# Patient Record
Sex: MALE | Race: WHITE
[De-identification: names, ages, dates, MRNs, and addresses within clinical notes are randomized per-mention and may not be internally consistent; named-entity substitution may affect disease eponyms.]

---

## 2019-06-30 ENCOUNTER — HOSPITAL ENCOUNTER (INPATIENT)
Dept: HOSPITAL 15 - ER | Age: 84
LOS: 17 days | Discharge: HOME | DRG: 656 | End: 2019-07-17
Attending: INTERNAL MEDICINE | Admitting: NURSE PRACTITIONER
Payer: COMMERCIAL

## 2019-06-30 VITALS — DIASTOLIC BLOOD PRESSURE: 72 MMHG | SYSTOLIC BLOOD PRESSURE: 130 MMHG

## 2019-06-30 VITALS — BODY MASS INDEX: 27.16 KG/M2 | HEIGHT: 75 IN | WEIGHT: 218.48 LBS

## 2019-06-30 DIAGNOSIS — N28.1: ICD-10-CM

## 2019-06-30 DIAGNOSIS — M94.8X9: ICD-10-CM

## 2019-06-30 DIAGNOSIS — M48.50XA: ICD-10-CM

## 2019-06-30 DIAGNOSIS — K21.9: ICD-10-CM

## 2019-06-30 DIAGNOSIS — K57.30: ICD-10-CM

## 2019-06-30 DIAGNOSIS — N17.0: ICD-10-CM

## 2019-06-30 DIAGNOSIS — E44.1: ICD-10-CM

## 2019-06-30 DIAGNOSIS — K56.609: ICD-10-CM

## 2019-06-30 DIAGNOSIS — E78.5: ICD-10-CM

## 2019-06-30 DIAGNOSIS — M94.8X8: ICD-10-CM

## 2019-06-30 DIAGNOSIS — C66.1: ICD-10-CM

## 2019-06-30 DIAGNOSIS — Z95.2: ICD-10-CM

## 2019-06-30 DIAGNOSIS — N18.3: ICD-10-CM

## 2019-06-30 DIAGNOSIS — D69.6: ICD-10-CM

## 2019-06-30 DIAGNOSIS — K43.2: ICD-10-CM

## 2019-06-30 DIAGNOSIS — Z90.5: ICD-10-CM

## 2019-06-30 DIAGNOSIS — J98.11: ICD-10-CM

## 2019-06-30 DIAGNOSIS — N13.30: ICD-10-CM

## 2019-06-30 DIAGNOSIS — I13.0: ICD-10-CM

## 2019-06-30 DIAGNOSIS — C67.9: Primary | ICD-10-CM

## 2019-06-30 DIAGNOSIS — I50.9: ICD-10-CM

## 2019-06-30 DIAGNOSIS — R91.1: ICD-10-CM

## 2019-06-30 LAB
ALBUMIN SERPL-MCNC: 3.3 G/DL (ref 3.4–5)
ALP SERPL-CCNC: 92 U/L (ref 45–117)
ALT SERPL-CCNC: 17 U/L (ref 16–61)
ANION GAP SERPL CALCULATED.3IONS-SCNC: 7 MMOL/L (ref 5–15)
APTT PPP: 26.4 SEC (ref 23.64–32.05)
BILIRUB SERPL-MCNC: 1.2 MG/DL (ref 0.2–1)
BUN SERPL-MCNC: 20 MG/DL (ref 7–18)
BUN/CREAT SERPL: 13.8
CALCIUM SERPL-MCNC: 8.2 MG/DL (ref 8.5–10.1)
CHLORIDE SERPL-SCNC: 104 MMOL/L (ref 98–107)
CO2 SERPL-SCNC: 29 MMOL/L (ref 21–32)
GLUCOSE SERPL-MCNC: 122 MG/DL (ref 74–106)
HCT VFR BLD AUTO: 45.8 % (ref 41–53)
HGB BLD-MCNC: 15.3 G/DL (ref 13.5–17.5)
INR PPP: 0.97 (ref 0.9–1.15)
MCH RBC QN AUTO: 32.1 PG (ref 28–32)
MCV RBC AUTO: 95.9 FL (ref 80–100)
NRBC BLD QL AUTO: 0.1 %
POTASSIUM SERPL-SCNC: 3.9 MMOL/L (ref 3.5–5.1)
PROT SERPL-MCNC: 6.3 G/DL (ref 6.4–8.2)
SODIUM SERPL-SCNC: 140 MMOL/L (ref 136–145)

## 2019-06-30 PROCEDURE — 96375 TX/PRO/DX INJ NEW DRUG ADDON: CPT

## 2019-06-30 PROCEDURE — 96361 HYDRATE IV INFUSION ADD-ON: CPT

## 2019-06-30 PROCEDURE — 93306 TTE W/DOPPLER COMPLETE: CPT

## 2019-06-30 PROCEDURE — 78707 K FLOW/FUNCT IMAGE W/O DRUG: CPT

## 2019-06-30 PROCEDURE — 97110 THERAPEUTIC EXERCISES: CPT

## 2019-06-30 PROCEDURE — 97530 THERAPEUTIC ACTIVITIES: CPT

## 2019-06-30 PROCEDURE — 85730 THROMBOPLASTIN TIME PARTIAL: CPT

## 2019-06-30 PROCEDURE — 81001 URINALYSIS AUTO W/SCOPE: CPT

## 2019-06-30 PROCEDURE — 86900 BLOOD TYPING SEROLOGIC ABO: CPT

## 2019-06-30 PROCEDURE — 94761 N-INVAS EAR/PLS OXIMETRY MLT: CPT

## 2019-06-30 PROCEDURE — 86901 BLOOD TYPING SEROLOGIC RH(D): CPT

## 2019-06-30 PROCEDURE — 85027 COMPLETE CBC AUTOMATED: CPT

## 2019-06-30 PROCEDURE — 86850 RBC ANTIBODY SCREEN: CPT

## 2019-06-30 PROCEDURE — 87086 URINE CULTURE/COLONY COUNT: CPT

## 2019-06-30 PROCEDURE — 71045 X-RAY EXAM CHEST 1 VIEW: CPT

## 2019-06-30 PROCEDURE — 80061 LIPID PANEL: CPT

## 2019-06-30 PROCEDURE — 76000 FLUOROSCOPY <1 HR PHYS/QHP: CPT

## 2019-06-30 PROCEDURE — 93005 ELECTROCARDIOGRAM TRACING: CPT

## 2019-06-30 PROCEDURE — 36415 COLL VENOUS BLD VENIPUNCTURE: CPT

## 2019-06-30 PROCEDURE — 83036 HEMOGLOBIN GLYCOSYLATED A1C: CPT

## 2019-06-30 PROCEDURE — 80053 COMPREHEN METABOLIC PANEL: CPT

## 2019-06-30 PROCEDURE — 85025 COMPLETE CBC W/AUTO DIFF WBC: CPT

## 2019-06-30 PROCEDURE — 74176 CT ABD & PELVIS W/O CONTRAST: CPT

## 2019-06-30 PROCEDURE — 87081 CULTURE SCREEN ONLY: CPT

## 2019-06-30 PROCEDURE — 85007 BL SMEAR W/DIFF WBC COUNT: CPT

## 2019-06-30 PROCEDURE — 71046 X-RAY EXAM CHEST 2 VIEWS: CPT

## 2019-06-30 PROCEDURE — 74018 RADEX ABDOMEN 1 VIEW: CPT

## 2019-06-30 PROCEDURE — 85610 PROTHROMBIN TIME: CPT

## 2019-06-30 PROCEDURE — 97116 GAIT TRAINING THERAPY: CPT

## 2019-06-30 PROCEDURE — 84484 ASSAY OF TROPONIN QUANT: CPT

## 2019-06-30 PROCEDURE — 96374 THER/PROPH/DIAG INJ IV PUSH: CPT

## 2019-06-30 PROCEDURE — 74250 X-RAY XM SM INT 1CNTRST STD: CPT

## 2019-06-30 PROCEDURE — 83735 ASSAY OF MAGNESIUM: CPT

## 2019-06-30 PROCEDURE — 83880 ASSAY OF NATRIURETIC PEPTIDE: CPT

## 2019-06-30 PROCEDURE — 80048 BASIC METABOLIC PNL TOTAL CA: CPT

## 2019-06-30 RX ADMIN — DOCUSATE SODIUM SCH MG: 100 CAPSULE, LIQUID FILLED ORAL at 22:17

## 2019-06-30 RX ADMIN — SODIUM CHLORIDE SCH MLS/HR: 0.9 INJECTION, SOLUTION INTRAVENOUS at 15:48

## 2019-06-30 RX ADMIN — CARVEDILOL SCH MG: 12.5 TABLET, FILM COATED ORAL at 22:00

## 2019-06-30 RX ADMIN — HYDROMORPHONE HYDROCHLORIDE PRN MG: 2 INJECTION, SOLUTION INTRAMUSCULAR; INTRAVENOUS; SUBCUTANEOUS at 15:49

## 2019-06-30 RX ADMIN — GABAPENTIN SCH MG: 400 CAPSULE ORAL at 22:17

## 2019-06-30 RX ADMIN — HYDROMORPHONE HYDROCHLORIDE PRN MG: 2 INJECTION, SOLUTION INTRAMUSCULAR; INTRAVENOUS; SUBCUTANEOUS at 22:28

## 2019-06-30 NOTE — NUR
Telemetry admit from [facility] admitted to Telemetry unit after SBAR received.  Patient oriented to VICTOR HUGO MCDUFFIE, primary RN, unit, room, bed, and unit policies regarding patient care and 
visiting hours. Patient now on continuous telemetry monitoring, tele box # 17 and telemetry 
reading on arrival to unit is . Patient placed on bedside oxygen, weighed by bed scale and 
encouraged to call if they need something. All questions and concerns addressed, patient 
verbalized understanding.

## 2019-07-01 VITALS — DIASTOLIC BLOOD PRESSURE: 69 MMHG | SYSTOLIC BLOOD PRESSURE: 153 MMHG

## 2019-07-01 VITALS — SYSTOLIC BLOOD PRESSURE: 142 MMHG | DIASTOLIC BLOOD PRESSURE: 77 MMHG

## 2019-07-01 VITALS — DIASTOLIC BLOOD PRESSURE: 65 MMHG | SYSTOLIC BLOOD PRESSURE: 127 MMHG

## 2019-07-01 VITALS — DIASTOLIC BLOOD PRESSURE: 77 MMHG | SYSTOLIC BLOOD PRESSURE: 158 MMHG

## 2019-07-01 VITALS — DIASTOLIC BLOOD PRESSURE: 77 MMHG | SYSTOLIC BLOOD PRESSURE: 175 MMHG

## 2019-07-01 VITALS — DIASTOLIC BLOOD PRESSURE: 70 MMHG | SYSTOLIC BLOOD PRESSURE: 144 MMHG

## 2019-07-01 LAB
CHOLEST SERPL-MCNC: 160 MG/DL (ref ?–200)
HCT VFR BLD AUTO: 44.5 % (ref 41–53)
HDLC SERPL-MCNC: 45 MG/DL (ref 40–59)
HGB BLD-MCNC: 15 G/DL (ref 13.5–17.5)
MCH RBC QN AUTO: 32.5 PG (ref 28–32)
MCV RBC AUTO: 96.3 FL (ref 80–100)
NRBC BLD QL AUTO: 0 %
TRIGL SERPL-MCNC: 197 MG/DL (ref ?–150)

## 2019-07-01 RX ADMIN — DOCUSATE SODIUM SCH MG: 100 CAPSULE, LIQUID FILLED ORAL at 21:41

## 2019-07-01 RX ADMIN — SODIUM CHLORIDE SCH MLS/HR: 0.9 INJECTION, SOLUTION INTRAVENOUS at 18:24

## 2019-07-01 RX ADMIN — HYDROMORPHONE HYDROCHLORIDE PRN MG: 2 INJECTION, SOLUTION INTRAMUSCULAR; INTRAVENOUS; SUBCUTANEOUS at 09:37

## 2019-07-01 RX ADMIN — FAMOTIDINE SCH MG: 20 TABLET, FILM COATED ORAL at 09:27

## 2019-07-01 RX ADMIN — CARVEDILOL SCH MG: 12.5 TABLET, FILM COATED ORAL at 09:28

## 2019-07-01 RX ADMIN — SODIUM CHLORIDE SCH MLS/HR: 0.9 INJECTION, SOLUTION INTRAVENOUS at 06:36

## 2019-07-01 RX ADMIN — DOCUSATE SODIUM SCH MG: 100 CAPSULE, LIQUID FILLED ORAL at 09:27

## 2019-07-01 RX ADMIN — CARVEDILOL SCH MG: 12.5 TABLET, FILM COATED ORAL at 21:40

## 2019-07-01 RX ADMIN — GABAPENTIN SCH MG: 400 CAPSULE ORAL at 09:28

## 2019-07-01 RX ADMIN — ACETAMINOPHEN PRN MG: 500 TABLET ORAL at 21:41

## 2019-07-01 RX ADMIN — GABAPENTIN SCH MG: 400 CAPSULE ORAL at 21:41

## 2019-07-02 VITALS — SYSTOLIC BLOOD PRESSURE: 150 MMHG | DIASTOLIC BLOOD PRESSURE: 71 MMHG

## 2019-07-02 VITALS — SYSTOLIC BLOOD PRESSURE: 153 MMHG | DIASTOLIC BLOOD PRESSURE: 84 MMHG

## 2019-07-02 VITALS — DIASTOLIC BLOOD PRESSURE: 84 MMHG | SYSTOLIC BLOOD PRESSURE: 153 MMHG

## 2019-07-02 VITALS — SYSTOLIC BLOOD PRESSURE: 156 MMHG | DIASTOLIC BLOOD PRESSURE: 87 MMHG

## 2019-07-02 VITALS — SYSTOLIC BLOOD PRESSURE: 141 MMHG | DIASTOLIC BLOOD PRESSURE: 73 MMHG

## 2019-07-02 VITALS — SYSTOLIC BLOOD PRESSURE: 147 MMHG | DIASTOLIC BLOOD PRESSURE: 78 MMHG

## 2019-07-02 VITALS — SYSTOLIC BLOOD PRESSURE: 134 MMHG | DIASTOLIC BLOOD PRESSURE: 55 MMHG

## 2019-07-02 LAB
ANION GAP SERPL CALCULATED.3IONS-SCNC: 8 MMOL/L (ref 5–15)
BUN SERPL-MCNC: 26 MG/DL (ref 7–18)
BUN/CREAT SERPL: 15.8
CALCIUM SERPL-MCNC: 7.8 MG/DL (ref 8.5–10.1)
CHLORIDE SERPL-SCNC: 109 MMOL/L (ref 98–107)
CO2 SERPL-SCNC: 27 MMOL/L (ref 21–32)
GLUCOSE SERPL-MCNC: 99 MG/DL (ref 74–106)
HCT VFR BLD AUTO: 40.4 % (ref 41–53)
HGB BLD-MCNC: 13.8 G/DL (ref 13.5–17.5)
MCH RBC QN AUTO: 32.6 PG (ref 28–32)
MCV RBC AUTO: 95.4 FL (ref 80–100)
NRBC BLD QL AUTO: 0 %
POTASSIUM SERPL-SCNC: 4.2 MMOL/L (ref 3.5–5.1)
SODIUM SERPL-SCNC: 144 MMOL/L (ref 136–145)

## 2019-07-02 RX ADMIN — GABAPENTIN SCH MG: 400 CAPSULE ORAL at 22:46

## 2019-07-02 RX ADMIN — DOCUSATE SODIUM SCH MG: 100 CAPSULE, LIQUID FILLED ORAL at 09:41

## 2019-07-02 RX ADMIN — GABAPENTIN SCH MG: 400 CAPSULE ORAL at 09:40

## 2019-07-02 RX ADMIN — SODIUM CHLORIDE SCH MLS/HR: 0.9 INJECTION, SOLUTION INTRAVENOUS at 07:30

## 2019-07-02 RX ADMIN — DOCUSATE SODIUM SCH MG: 100 CAPSULE, LIQUID FILLED ORAL at 22:45

## 2019-07-02 RX ADMIN — CARVEDILOL SCH MG: 12.5 TABLET, FILM COATED ORAL at 09:41

## 2019-07-02 RX ADMIN — CARVEDILOL SCH MG: 12.5 TABLET, FILM COATED ORAL at 22:46

## 2019-07-02 RX ADMIN — ACETAMINOPHEN PRN MG: 500 TABLET ORAL at 09:40

## 2019-07-02 RX ADMIN — FAMOTIDINE SCH MG: 20 TABLET, FILM COATED ORAL at 09:40

## 2019-07-02 RX ADMIN — HYDROMORPHONE HYDROCHLORIDE PRN MG: 2 INJECTION, SOLUTION INTRAMUSCULAR; INTRAVENOUS; SUBCUTANEOUS at 15:22

## 2019-07-02 RX ADMIN — HYDROMORPHONE HYDROCHLORIDE PRN MG: 2 INJECTION, SOLUTION INTRAMUSCULAR; INTRAVENOUS; SUBCUTANEOUS at 22:47

## 2019-07-02 RX ADMIN — ACETAMINOPHEN PRN MG: 500 TABLET ORAL at 17:02

## 2019-07-02 NOTE — NUR
Opening Shift Note

Received report from vale Alcala RN.  Assumed care of patient, awake and alert.  No S/S of 
distress/SOB or pain.  Instructed on POC and to call for assist PRN, will continue to 
monitor for changes Q1hr and PRN.  Bed placed in lowest position, bed alarm turned on and 
call light within reach.

## 2019-07-03 VITALS — DIASTOLIC BLOOD PRESSURE: 66 MMHG | SYSTOLIC BLOOD PRESSURE: 157 MMHG

## 2019-07-03 VITALS — DIASTOLIC BLOOD PRESSURE: 78 MMHG | SYSTOLIC BLOOD PRESSURE: 143 MMHG

## 2019-07-03 VITALS — DIASTOLIC BLOOD PRESSURE: 56 MMHG | SYSTOLIC BLOOD PRESSURE: 141 MMHG

## 2019-07-03 VITALS — DIASTOLIC BLOOD PRESSURE: 75 MMHG | SYSTOLIC BLOOD PRESSURE: 165 MMHG

## 2019-07-03 LAB
ANION GAP SERPL CALCULATED.3IONS-SCNC: 6 MMOL/L (ref 5–15)
BUN SERPL-MCNC: 21 MG/DL (ref 7–18)
BUN/CREAT SERPL: 14.1
CALCIUM SERPL-MCNC: 8.2 MG/DL (ref 8.5–10.1)
CHLORIDE SERPL-SCNC: 106 MMOL/L (ref 98–107)
CO2 SERPL-SCNC: 28 MMOL/L (ref 21–32)
GLUCOSE SERPL-MCNC: 105 MG/DL (ref 74–106)
HCT VFR BLD AUTO: 38.9 % (ref 41–53)
HGB BLD-MCNC: 13.4 G/DL (ref 13.5–17.5)
MCH RBC QN AUTO: 32.6 PG (ref 28–32)
MCV RBC AUTO: 95 FL (ref 80–100)
NRBC BLD QL AUTO: 0 %
POTASSIUM SERPL-SCNC: 4.1 MMOL/L (ref 3.5–5.1)
SODIUM SERPL-SCNC: 140 MMOL/L (ref 136–145)

## 2019-07-03 PROCEDURE — 0TJ98ZZ INSPECTION OF URETER, VIA NATURAL OR ARTIFICIAL OPENING ENDOSCOPIC: ICD-10-PCS | Performed by: UROLOGY

## 2019-07-03 PROCEDURE — 0T9B70Z DRAINAGE OF BLADDER WITH DRAINAGE DEVICE, VIA NATURAL OR ARTIFICIAL OPENING: ICD-10-PCS | Performed by: UROLOGY

## 2019-07-03 RX ADMIN — CYANOCOBALAMIN TAB 500 MCG SCH MCG: 500 TAB at 10:00

## 2019-07-03 RX ADMIN — CARVEDILOL SCH MG: 12.5 TABLET, FILM COATED ORAL at 21:42

## 2019-07-03 RX ADMIN — SODIUM CHLORIDE SCH MLS/HR: 0.9 INJECTION, SOLUTION INTRAVENOUS at 00:20

## 2019-07-03 RX ADMIN — CARVEDILOL SCH MG: 12.5 TABLET, FILM COATED ORAL at 10:00

## 2019-07-03 RX ADMIN — GABAPENTIN SCH MG: 400 CAPSULE ORAL at 10:00

## 2019-07-03 RX ADMIN — GABAPENTIN SCH MG: 400 CAPSULE ORAL at 21:42

## 2019-07-03 RX ADMIN — DOCUSATE SODIUM SCH MG: 100 CAPSULE, LIQUID FILLED ORAL at 21:40

## 2019-07-03 RX ADMIN — SODIUM CHLORIDE SCH MLS/HR: 0.9 INJECTION, SOLUTION INTRAVENOUS at 10:10

## 2019-07-03 RX ADMIN — CEFTRIAXONE SODIUM SCH MLS/HR: 1 INJECTION, POWDER, FOR SOLUTION INTRAMUSCULAR; INTRAVENOUS at 09:15

## 2019-07-03 RX ADMIN — HYDRALAZINE HYDROCHLORIDE PRN MG: 20 INJECTION INTRAMUSCULAR; INTRAVENOUS at 14:50

## 2019-07-03 RX ADMIN — FAMOTIDINE SCH MG: 20 TABLET, FILM COATED ORAL at 10:00

## 2019-07-03 RX ADMIN — DOCUSATE SODIUM SCH MG: 100 CAPSULE, LIQUID FILLED ORAL at 10:00

## 2019-07-03 RX ADMIN — HYDRALAZINE HYDROCHLORIDE PRN MG: 20 INJECTION INTRAMUSCULAR; INTRAVENOUS at 15:08

## 2019-07-03 NOTE — NUR
ROUNDS



PATIENT IS RESTING IN BED WITH EYES CLOSED, NO DISTRESS NOTED, SATURATING AT 95% ON ROOM 
AIR.  PATIENT DENIES PAIN.

## 2019-07-03 NOTE — NUR
IV insertion

IV access obtained, via clean sterile technique by inserting 22 gauge catheter at right 
forearm after first attempt. IV secured properly. No trauma to site. Patient tolerated well.

## 2019-07-03 NOTE — NUR
OPENING SHIFT NOTE

Assumed care of patient. A&O x4. Currently on 3lpm via NC. Patient does not use O2 at 
baseline. No SOB or distress noted. Patient denies pain at this time. 3 way urethral 
catheter in place with continuous irrigation flowing. Output is light pink in color. POC 
discussed with patient. Encouraged to call or assistance when needed. Bed left in low locked 
position with side rails up x2. Call bell is within reach. Will continue to monitor PRN.

## 2019-07-04 VITALS — SYSTOLIC BLOOD PRESSURE: 136 MMHG | DIASTOLIC BLOOD PRESSURE: 57 MMHG

## 2019-07-04 VITALS — DIASTOLIC BLOOD PRESSURE: 70 MMHG | SYSTOLIC BLOOD PRESSURE: 145 MMHG

## 2019-07-04 VITALS — DIASTOLIC BLOOD PRESSURE: 74 MMHG | SYSTOLIC BLOOD PRESSURE: 145 MMHG

## 2019-07-04 VITALS — SYSTOLIC BLOOD PRESSURE: 127 MMHG | DIASTOLIC BLOOD PRESSURE: 61 MMHG

## 2019-07-04 VITALS — DIASTOLIC BLOOD PRESSURE: 69 MMHG | SYSTOLIC BLOOD PRESSURE: 137 MMHG

## 2019-07-04 LAB
ANION GAP SERPL CALCULATED.3IONS-SCNC: 7 MMOL/L (ref 5–15)
BUN SERPL-MCNC: 21 MG/DL (ref 7–18)
BUN/CREAT SERPL: 13.5
CALCIUM SERPL-MCNC: 8.5 MG/DL (ref 8.5–10.1)
CHLORIDE SERPL-SCNC: 104 MMOL/L (ref 98–107)
CO2 SERPL-SCNC: 29 MMOL/L (ref 21–32)
GLUCOSE SERPL-MCNC: 98 MG/DL (ref 74–106)
HCT VFR BLD AUTO: 43.3 % (ref 41–53)
HGB BLD-MCNC: 14.9 G/DL (ref 13.5–17.5)
MCH RBC QN AUTO: 32.8 PG (ref 28–32)
MCV RBC AUTO: 95.4 FL (ref 80–100)
NRBC BLD QL AUTO: 0.1 %
POTASSIUM SERPL-SCNC: 4.2 MMOL/L (ref 3.5–5.1)
SODIUM SERPL-SCNC: 140 MMOL/L (ref 136–145)

## 2019-07-04 RX ADMIN — FAMOTIDINE SCH MG: 20 TABLET, FILM COATED ORAL at 10:21

## 2019-07-04 RX ADMIN — GABAPENTIN SCH MG: 400 CAPSULE ORAL at 21:51

## 2019-07-04 RX ADMIN — SODIUM CHLORIDE SCH MLS/HR: 0.9 INJECTION, SOLUTION INTRAVENOUS at 12:50

## 2019-07-04 RX ADMIN — CEFTRIAXONE SODIUM SCH MLS/HR: 1 INJECTION, POWDER, FOR SOLUTION INTRAMUSCULAR; INTRAVENOUS at 08:43

## 2019-07-04 RX ADMIN — GABAPENTIN SCH MG: 400 CAPSULE ORAL at 10:22

## 2019-07-04 RX ADMIN — SODIUM CHLORIDE SCH MLS/HR: 0.9 INJECTION, SOLUTION INTRAVENOUS at 01:01

## 2019-07-04 RX ADMIN — ATROPA BELLADONNA AND OPIUM SCH SUPP: 16.2; 3 SUPPOSITORY RECTAL at 10:00

## 2019-07-04 RX ADMIN — CARVEDILOL SCH MG: 12.5 TABLET, FILM COATED ORAL at 21:51

## 2019-07-04 RX ADMIN — DOCUSATE SODIUM SCH MG: 100 CAPSULE, LIQUID FILLED ORAL at 21:50

## 2019-07-04 RX ADMIN — CYANOCOBALAMIN TAB 500 MCG SCH MCG: 500 TAB at 10:22

## 2019-07-04 RX ADMIN — CARVEDILOL SCH MG: 12.5 TABLET, FILM COATED ORAL at 10:23

## 2019-07-04 RX ADMIN — DOCUSATE SODIUM SCH MG: 100 CAPSULE, LIQUID FILLED ORAL at 10:22

## 2019-07-04 NOTE — NUR
Opening Shift Note

Assumed care of patient, awake and alert.  Currently on room air with No S/S of 
distress/SOB. Patient denies pain at this time. Ortega catheter in place, secured to leg and 
draining to gravity. NS infusing at 75ml/hr into 22 gauge IV in right forearm with no s/s of 
irritation or infiltration. SCD's in place on BLE. Educated on incentive spirometer use; 
understanding confirmed by patients return demonstration. Bed is in low locked position with 
side rails up x2.  Instructed to call for assist PRN, will continue to monitor for changes 
Q1hr and PRN.

## 2019-07-04 NOTE — NUR
Nutrition Assessment Notes 



please see  link for complete assessment



Est. Needs BW [id number] kcal (25-30 kcal/kgBW), 75-94 gms pro (0.8-1.0 gms/kgBW r/t 
elev RFT). Will continue to monitor pertinent labs and reassess nutrient need prn


-------------------------------------------------------------------------------

Addendum: 19 at 1115 by Adele Alexander RD

-------------------------------------------------------------------------------

Amended: Links added.

## 2019-07-04 NOTE — NUR
Opening Shift Note

RECEIVED REPORT FROM NOC RN. Assumed care of patient, awake and alert. No S/S of 
distress/SOB or pain. BED IN LOWEST, LOCKED POSITION WITH SIDERAILS UPx2. Instructed on POC 
and to call for assist PRN, will continue to monitor for changes Q1hr and PRN.

## 2019-07-05 VITALS — DIASTOLIC BLOOD PRESSURE: 84 MMHG | SYSTOLIC BLOOD PRESSURE: 134 MMHG

## 2019-07-05 VITALS — SYSTOLIC BLOOD PRESSURE: 125 MMHG | DIASTOLIC BLOOD PRESSURE: 66 MMHG

## 2019-07-05 VITALS — SYSTOLIC BLOOD PRESSURE: 132 MMHG | DIASTOLIC BLOOD PRESSURE: 66 MMHG

## 2019-07-05 VITALS — SYSTOLIC BLOOD PRESSURE: 131 MMHG | DIASTOLIC BLOOD PRESSURE: 63 MMHG

## 2019-07-05 VITALS — DIASTOLIC BLOOD PRESSURE: 69 MMHG | SYSTOLIC BLOOD PRESSURE: 140 MMHG

## 2019-07-05 LAB
ANION GAP SERPL CALCULATED.3IONS-SCNC: 6 MMOL/L (ref 5–15)
BUN SERPL-MCNC: 23 MG/DL (ref 7–18)
BUN/CREAT SERPL: 14.5
CALCIUM SERPL-MCNC: 7.9 MG/DL (ref 8.5–10.1)
CHLORIDE SERPL-SCNC: 104 MMOL/L (ref 98–107)
CO2 SERPL-SCNC: 28 MMOL/L (ref 21–32)
GLUCOSE SERPL-MCNC: 106 MG/DL (ref 74–106)
MAGNESIUM SERPL-MCNC: 2.3 MG/DL (ref 1.6–2.6)
POTASSIUM SERPL-SCNC: 4.2 MMOL/L (ref 3.5–5.1)
SODIUM SERPL-SCNC: 138 MMOL/L (ref 136–145)

## 2019-07-05 RX ADMIN — SODIUM CHLORIDE SCH MLS/HR: 0.9 INJECTION, SOLUTION INTRAVENOUS at 04:53

## 2019-07-05 RX ADMIN — CARVEDILOL SCH MG: 12.5 TABLET, FILM COATED ORAL at 21:31

## 2019-07-05 RX ADMIN — ATROPA BELLADONNA AND OPIUM SCH SUPP: 16.2; 3 SUPPOSITORY RECTAL at 10:00

## 2019-07-05 RX ADMIN — DOCUSATE SODIUM SCH MG: 100 CAPSULE, LIQUID FILLED ORAL at 10:31

## 2019-07-05 RX ADMIN — POLYETHYLENE GLYCOL 3350 SCH GM: 17 POWDER, FOR SOLUTION ORAL at 10:31

## 2019-07-05 RX ADMIN — CYANOCOBALAMIN TAB 500 MCG SCH MCG: 500 TAB at 10:29

## 2019-07-05 RX ADMIN — HYDROMORPHONE HYDROCHLORIDE PRN MG: 2 INJECTION, SOLUTION INTRAMUSCULAR; INTRAVENOUS; SUBCUTANEOUS at 19:37

## 2019-07-05 RX ADMIN — CEFTRIAXONE SODIUM SCH MLS/HR: 1 INJECTION, POWDER, FOR SOLUTION INTRAMUSCULAR; INTRAVENOUS at 10:39

## 2019-07-05 RX ADMIN — GABAPENTIN SCH MG: 400 CAPSULE ORAL at 10:31

## 2019-07-05 RX ADMIN — ENOXAPARIN SODIUM SCH MG: 40 INJECTION SUBCUTANEOUS at 10:00

## 2019-07-05 RX ADMIN — DOCUSATE SODIUM SCH MG: 100 CAPSULE, LIQUID FILLED ORAL at 21:31

## 2019-07-05 RX ADMIN — FAMOTIDINE SCH MG: 20 TABLET, FILM COATED ORAL at 10:29

## 2019-07-05 RX ADMIN — GABAPENTIN SCH MG: 400 CAPSULE ORAL at 21:31

## 2019-07-05 RX ADMIN — CARVEDILOL SCH MG: 12.5 TABLET, FILM COATED ORAL at 10:31

## 2019-07-05 NOTE — NUR
Assisted the patient back to bed. Patient stated he had a small bowel movement. Patient 
already flushed the toilet.

## 2019-07-05 NOTE — NUR
OPENING NOTE 

Received report from day shift RN. Patient is A&O X's 4 with no s/s of distress noted. 
Patient's family is at bedside. Educated patient on POC and to use call light when in need 
of assistance. Patient verbalized understanding. Patient's rao is in place, performed 
catheter care. Rao is free of kinks and is below level of bladder, draining yellow and 
clear urine. Bed is in lowest/locked position with side rails up X's 2 and call light is 
within reach of patient. Bed alarm is on. Will continue to monitor for changes and round 
hourly/PRN.

## 2019-07-05 NOTE — NUR
New IV line inserted on the left wrist, 22 gauge, in one attempt, intact and patent. IV line 
on the right AC with dry blood noted, IV line removed, IV catheter intact, pressure dressing 
applied; IV line on the right forearm, inflammation noted on the site, IV line removed, IV 
catheter intact, pressure dressing applied.

## 2019-07-05 NOTE — NUR
Assisted the patient to the bathroom to have bowel movement. Instructed the patient to pull 
the red string when he's done.

## 2019-07-06 VITALS — DIASTOLIC BLOOD PRESSURE: 66 MMHG | SYSTOLIC BLOOD PRESSURE: 133 MMHG

## 2019-07-06 VITALS — SYSTOLIC BLOOD PRESSURE: 130 MMHG | DIASTOLIC BLOOD PRESSURE: 72 MMHG

## 2019-07-06 VITALS — SYSTOLIC BLOOD PRESSURE: 145 MMHG | DIASTOLIC BLOOD PRESSURE: 71 MMHG

## 2019-07-06 VITALS — DIASTOLIC BLOOD PRESSURE: 90 MMHG | SYSTOLIC BLOOD PRESSURE: 152 MMHG

## 2019-07-06 VITALS — DIASTOLIC BLOOD PRESSURE: 52 MMHG | SYSTOLIC BLOOD PRESSURE: 121 MMHG

## 2019-07-06 VITALS — SYSTOLIC BLOOD PRESSURE: 132 MMHG | DIASTOLIC BLOOD PRESSURE: 74 MMHG

## 2019-07-06 RX ADMIN — CARVEDILOL SCH MG: 12.5 TABLET, FILM COATED ORAL at 09:35

## 2019-07-06 RX ADMIN — GABAPENTIN SCH MG: 400 CAPSULE ORAL at 09:34

## 2019-07-06 RX ADMIN — ATROPA BELLADONNA AND OPIUM SCH SUPP: 16.2; 3 SUPPOSITORY RECTAL at 10:00

## 2019-07-06 RX ADMIN — GABAPENTIN SCH MG: 400 CAPSULE ORAL at 22:43

## 2019-07-06 RX ADMIN — CARVEDILOL SCH MG: 12.5 TABLET, FILM COATED ORAL at 22:43

## 2019-07-06 RX ADMIN — ENOXAPARIN SODIUM SCH MG: 40 INJECTION SUBCUTANEOUS at 09:33

## 2019-07-06 RX ADMIN — CYANOCOBALAMIN TAB 500 MCG SCH MCG: 500 TAB at 09:34

## 2019-07-06 RX ADMIN — DOCUSATE SODIUM SCH MG: 100 CAPSULE, LIQUID FILLED ORAL at 09:34

## 2019-07-06 RX ADMIN — DOCUSATE SODIUM SCH MG: 100 CAPSULE, LIQUID FILLED ORAL at 22:43

## 2019-07-06 RX ADMIN — FAMOTIDINE SCH MG: 20 TABLET, FILM COATED ORAL at 09:34

## 2019-07-06 RX ADMIN — HYDROMORPHONE HYDROCHLORIDE PRN MG: 2 INJECTION, SOLUTION INTRAMUSCULAR; INTRAVENOUS; SUBCUTANEOUS at 17:36

## 2019-07-06 RX ADMIN — POLYETHYLENE GLYCOL 3350 SCH GM: 17 POWDER, FOR SOLUTION ORAL at 10:00

## 2019-07-06 RX ADMIN — CEFTRIAXONE SODIUM SCH MLS/HR: 1 INJECTION, POWDER, FOR SOLUTION INTRAMUSCULAR; INTRAVENOUS at 09:33

## 2019-07-06 NOTE — NUR
IV insertion

IV access obtained, via clean sterile technique by inserting 22 gauge catheter at  after  
attempt(s). IV secured properly. No trauma to site. Patient tolerated procedure well.

## 2019-07-06 NOTE — NUR
Opening Shift Note

Assumed care of patient, sleeping soundly.  No S/S of distress/SOB or pain.  This RN will 
continue to monitor for changes Q1hr and PRN. Bed low. Call light within reach. HOB in 
Parks's position.

## 2019-07-06 NOTE — NUR
PHYSICAL THERAPY

PATIENT IS AMBULATING IN THE HALLWAYS WITH THE PHYSICAL THERAPY, TOLERATED WELL, PATIENT'S 
WIFE AWARE AT BED SIDE

## 2019-07-06 NOTE — NUR
ASSESSMENT NOTE



 Patient is A&O X's 4 with no s/s of distress noted. resting in bed comfortably, able to 
verbalis his needs, self reposition. Patient's rao is in place, Rao is free of kinks and 
is below level of bladder, draining yellow and clear urine. Bed is in lowest/locked position 
with side rails up X's 2 and call light is within reach of patient. Bed alarm is on. Will 
continue to monitor for changes and round hourly and as needed.

## 2019-07-07 VITALS — SYSTOLIC BLOOD PRESSURE: 124 MMHG | DIASTOLIC BLOOD PRESSURE: 69 MMHG

## 2019-07-07 VITALS — DIASTOLIC BLOOD PRESSURE: 63 MMHG | SYSTOLIC BLOOD PRESSURE: 121 MMHG

## 2019-07-07 VITALS — SYSTOLIC BLOOD PRESSURE: 127 MMHG | DIASTOLIC BLOOD PRESSURE: 67 MMHG

## 2019-07-07 VITALS — SYSTOLIC BLOOD PRESSURE: 132 MMHG | DIASTOLIC BLOOD PRESSURE: 74 MMHG

## 2019-07-07 VITALS — DIASTOLIC BLOOD PRESSURE: 76 MMHG | SYSTOLIC BLOOD PRESSURE: 152 MMHG

## 2019-07-07 VITALS — SYSTOLIC BLOOD PRESSURE: 137 MMHG | DIASTOLIC BLOOD PRESSURE: 79 MMHG

## 2019-07-07 RX ADMIN — FAMOTIDINE SCH MG: 20 TABLET, FILM COATED ORAL at 09:15

## 2019-07-07 RX ADMIN — CYANOCOBALAMIN TAB 500 MCG SCH MCG: 500 TAB at 09:14

## 2019-07-07 RX ADMIN — DOCUSATE SODIUM SCH MG: 100 CAPSULE, LIQUID FILLED ORAL at 09:14

## 2019-07-07 RX ADMIN — POLYETHYLENE GLYCOL 3350 SCH GM: 17 POWDER, FOR SOLUTION ORAL at 09:16

## 2019-07-07 RX ADMIN — CARVEDILOL SCH MG: 12.5 TABLET, FILM COATED ORAL at 09:15

## 2019-07-07 RX ADMIN — ATROPA BELLADONNA AND OPIUM SCH SUPP: 16.2; 3 SUPPOSITORY RECTAL at 09:14

## 2019-07-07 RX ADMIN — GABAPENTIN SCH MG: 400 CAPSULE ORAL at 09:14

## 2019-07-07 RX ADMIN — DOCUSATE SODIUM SCH MG: 100 CAPSULE, LIQUID FILLED ORAL at 21:48

## 2019-07-07 RX ADMIN — ENOXAPARIN SODIUM SCH MG: 40 INJECTION SUBCUTANEOUS at 09:12

## 2019-07-07 RX ADMIN — CARVEDILOL SCH MG: 12.5 TABLET, FILM COATED ORAL at 21:48

## 2019-07-07 RX ADMIN — CEFTRIAXONE SODIUM SCH MLS/HR: 1 INJECTION, POWDER, FOR SOLUTION INTRAMUSCULAR; INTRAVENOUS at 09:12

## 2019-07-07 RX ADMIN — GABAPENTIN SCH MG: 400 CAPSULE ORAL at 21:49

## 2019-07-08 VITALS — SYSTOLIC BLOOD PRESSURE: 153 MMHG | DIASTOLIC BLOOD PRESSURE: 82 MMHG

## 2019-07-08 VITALS — SYSTOLIC BLOOD PRESSURE: 140 MMHG | DIASTOLIC BLOOD PRESSURE: 77 MMHG

## 2019-07-08 VITALS — DIASTOLIC BLOOD PRESSURE: 70 MMHG | SYSTOLIC BLOOD PRESSURE: 144 MMHG

## 2019-07-08 VITALS — DIASTOLIC BLOOD PRESSURE: 68 MMHG | SYSTOLIC BLOOD PRESSURE: 131 MMHG

## 2019-07-08 VITALS — DIASTOLIC BLOOD PRESSURE: 80 MMHG | SYSTOLIC BLOOD PRESSURE: 143 MMHG

## 2019-07-08 RX ADMIN — CARVEDILOL SCH MG: 12.5 TABLET, FILM COATED ORAL at 21:23

## 2019-07-08 RX ADMIN — CARVEDILOL SCH MG: 12.5 TABLET, FILM COATED ORAL at 10:08

## 2019-07-08 RX ADMIN — ENOXAPARIN SODIUM SCH MG: 40 INJECTION SUBCUTANEOUS at 10:09

## 2019-07-08 RX ADMIN — CYANOCOBALAMIN TAB 500 MCG SCH MCG: 500 TAB at 10:08

## 2019-07-08 RX ADMIN — PHENAZOPYRIDINE SCH MG: 100 TABLET ORAL at 12:44

## 2019-07-08 RX ADMIN — FAMOTIDINE SCH MG: 20 TABLET, FILM COATED ORAL at 10:08

## 2019-07-08 RX ADMIN — GABAPENTIN SCH MG: 400 CAPSULE ORAL at 21:22

## 2019-07-08 RX ADMIN — HYDROMORPHONE HYDROCHLORIDE PRN MG: 2 INJECTION, SOLUTION INTRAMUSCULAR; INTRAVENOUS; SUBCUTANEOUS at 13:52

## 2019-07-08 RX ADMIN — POLYETHYLENE GLYCOL 3350 SCH GM: 17 POWDER, FOR SOLUTION ORAL at 10:00

## 2019-07-08 RX ADMIN — CEFTRIAXONE SODIUM SCH MLS/HR: 1 INJECTION, POWDER, FOR SOLUTION INTRAMUSCULAR; INTRAVENOUS at 10:02

## 2019-07-08 RX ADMIN — PHENAZOPYRIDINE SCH MG: 100 TABLET ORAL at 18:26

## 2019-07-08 RX ADMIN — GABAPENTIN SCH MG: 400 CAPSULE ORAL at 10:09

## 2019-07-08 RX ADMIN — DOCUSATE SODIUM SCH MG: 100 CAPSULE, LIQUID FILLED ORAL at 10:00

## 2019-07-08 RX ADMIN — ATROPA BELLADONNA AND OPIUM SCH SUPP: 16.2; 3 SUPPOSITORY RECTAL at 10:00

## 2019-07-08 NOTE — NUR
Opening Shift Note

Assumed care of patient, awake and alert.  No S/S of distress/SOB or pain.  Instructed on 
POC and to call for assist PRN, for surgery tomorrow, NPO after MN instructed. Patient and 
family verbalized understanding, call light within reach, bed alarm on, will continue to 
monitor for changes Q1hr and PRN.

## 2019-07-08 NOTE — NUR
Nutrition Follow-up Notes 



Wt.: 73.2 kg



Pt was sleeping with no family by bedside. per pt records pt s/p stent for hydronephrosis 
and to have sx to mo for renal mass. pt with no distress noted per nursing. pt is currently 
on 2 gm na diet with adequate PO of > 75% x6 per RN doc



Est. Needs BW [id number] kcal (25-30 kcal/kgBW), 75-94 gms pro (0.8-1.0 gms/kgBW r/t 
elev RFT). Will continue to monitor pertinent labs and reassess nutrient need prn



Labs: BUN 23 H, CREAT 1.59 H, CA 7.9 L.



Skin: Charbel scale 20, low risk, skin intact per RN doc 



GI: Pt had 2 BM yesterday per RN documentation. 



PES:  

Altered nutrition related lab values r/t current/chronic medical condition aeb elev BUN, 
mild hypoalb, elev TG



Will continue to monitor PO intake, skin status, pertinent labs and weight trend. F/u in 3-5 
days.

Rec.: 1.) consider low TG diet along with current diet. 2) continue current plan of care

## 2019-07-08 NOTE — NUR
Opening Shift Note

Assumed care of patient, awake and alert. No S/S of distress OR SOB. Pt denies any pain at 
this time. Bed in lowest and locked position with side rails up x2. Instructed on POC and to 
call for assist PRN, will continue to monitor for changes Q1hr and PRN.

## 2019-07-09 VITALS — SYSTOLIC BLOOD PRESSURE: 160 MMHG

## 2019-07-09 VITALS — SYSTOLIC BLOOD PRESSURE: 152 MMHG

## 2019-07-09 VITALS — SYSTOLIC BLOOD PRESSURE: 129 MMHG | DIASTOLIC BLOOD PRESSURE: 58 MMHG

## 2019-07-09 VITALS — DIASTOLIC BLOOD PRESSURE: 78 MMHG | SYSTOLIC BLOOD PRESSURE: 174 MMHG

## 2019-07-09 VITALS — DIASTOLIC BLOOD PRESSURE: 58 MMHG | SYSTOLIC BLOOD PRESSURE: 155 MMHG

## 2019-07-09 VITALS — DIASTOLIC BLOOD PRESSURE: 76 MMHG | SYSTOLIC BLOOD PRESSURE: 154 MMHG

## 2019-07-09 VITALS — SYSTOLIC BLOOD PRESSURE: 161 MMHG

## 2019-07-09 VITALS — SYSTOLIC BLOOD PRESSURE: 162 MMHG

## 2019-07-09 VITALS — SYSTOLIC BLOOD PRESSURE: 150 MMHG

## 2019-07-09 VITALS — DIASTOLIC BLOOD PRESSURE: 75 MMHG | SYSTOLIC BLOOD PRESSURE: 177 MMHG

## 2019-07-09 VITALS — SYSTOLIC BLOOD PRESSURE: 167 MMHG

## 2019-07-09 VITALS — SYSTOLIC BLOOD PRESSURE: 163 MMHG | DIASTOLIC BLOOD PRESSURE: 62 MMHG

## 2019-07-09 VITALS — SYSTOLIC BLOOD PRESSURE: 138 MMHG | DIASTOLIC BLOOD PRESSURE: 76 MMHG

## 2019-07-09 VITALS — SYSTOLIC BLOOD PRESSURE: 159 MMHG | DIASTOLIC BLOOD PRESSURE: 61 MMHG

## 2019-07-09 VITALS — DIASTOLIC BLOOD PRESSURE: 66 MMHG | SYSTOLIC BLOOD PRESSURE: 154 MMHG

## 2019-07-09 VITALS — DIASTOLIC BLOOD PRESSURE: 57 MMHG

## 2019-07-09 VITALS — DIASTOLIC BLOOD PRESSURE: 64 MMHG

## 2019-07-09 VITALS — DIASTOLIC BLOOD PRESSURE: 70 MMHG | SYSTOLIC BLOOD PRESSURE: 177 MMHG

## 2019-07-09 VITALS — DIASTOLIC BLOOD PRESSURE: 64 MMHG | SYSTOLIC BLOOD PRESSURE: 124 MMHG

## 2019-07-09 VITALS — DIASTOLIC BLOOD PRESSURE: 69 MMHG | SYSTOLIC BLOOD PRESSURE: 149 MMHG

## 2019-07-09 VITALS — DIASTOLIC BLOOD PRESSURE: 75 MMHG | SYSTOLIC BLOOD PRESSURE: 183 MMHG

## 2019-07-09 LAB
ANION GAP SERPL CALCULATED.3IONS-SCNC: 6 MMOL/L (ref 5–15)
APTT PPP: 28.5 SEC (ref 23.64–32.05)
BUN SERPL-MCNC: 20 MG/DL (ref 7–18)
BUN/CREAT SERPL: 12.7
CALCIUM SERPL-MCNC: 8.4 MG/DL (ref 8.5–10.1)
CHLORIDE SERPL-SCNC: 106 MMOL/L (ref 98–107)
CO2 SERPL-SCNC: 28 MMOL/L (ref 21–32)
GLUCOSE SERPL-MCNC: 99 MG/DL (ref 74–106)
HCT VFR BLD AUTO: 36.7 % (ref 41–53)
HGB BLD-MCNC: 12.5 G/DL (ref 13.5–17.5)
INR PPP: 0.98 (ref 0.9–1.15)
MCH RBC QN AUTO: 32.6 PG (ref 28–32)
MCV RBC AUTO: 96 FL (ref 80–100)
NRBC BLD QL AUTO: 0.1 %
POTASSIUM SERPL-SCNC: 4.6 MMOL/L (ref 3.5–5.1)
SODIUM SERPL-SCNC: 140 MMOL/L (ref 136–145)

## 2019-07-09 PROCEDURE — 0TBC4ZX EXCISION OF BLADDER NECK, PERCUTANEOUS ENDOSCOPIC APPROACH, DIAGNOSTIC: ICD-10-PCS | Performed by: UROLOGY

## 2019-07-09 PROCEDURE — 0TP98DZ REMOVAL OF INTRALUMINAL DEVICE FROM URETER, VIA NATURAL OR ARTIFICIAL OPENING ENDOSCOPIC: ICD-10-PCS | Performed by: UROLOGY

## 2019-07-09 PROCEDURE — 0TT04ZZ RESECTION OF RIGHT KIDNEY, PERCUTANEOUS ENDOSCOPIC APPROACH: ICD-10-PCS | Performed by: UROLOGY

## 2019-07-09 PROCEDURE — 8E0W4CZ ROBOTIC ASSISTED PROCEDURE OF TRUNK REGION, PERCUTANEOUS ENDOSCOPIC APPROACH: ICD-10-PCS | Performed by: UROLOGY

## 2019-07-09 PROCEDURE — 0TB64ZZ EXCISION OF RIGHT URETER, PERCUTANEOUS ENDOSCOPIC APPROACH: ICD-10-PCS | Performed by: UROLOGY

## 2019-07-09 PROCEDURE — 07BD4ZZ EXCISION OF AORTIC LYMPHATIC, PERCUTANEOUS ENDOSCOPIC APPROACH: ICD-10-PCS | Performed by: UROLOGY

## 2019-07-09 RX ADMIN — FAMOTIDINE SCH MG: 20 TABLET, FILM COATED ORAL at 09:05

## 2019-07-09 RX ADMIN — HYDROMORPHONE HYDROCHLORIDE PRN MG: 2 INJECTION, SOLUTION INTRAMUSCULAR; INTRAVENOUS; SUBCUTANEOUS at 18:01

## 2019-07-09 RX ADMIN — HYDRALAZINE HYDROCHLORIDE PRN MG: 20 INJECTION INTRAMUSCULAR; INTRAVENOUS at 17:34

## 2019-07-09 RX ADMIN — ATROPA BELLADONNA AND OPIUM SCH SUPP: 16.2; 3 SUPPOSITORY RECTAL at 10:00

## 2019-07-09 RX ADMIN — PHENAZOPYRIDINE SCH MG: 100 TABLET ORAL at 17:43

## 2019-07-09 RX ADMIN — CYANOCOBALAMIN TAB 500 MCG SCH MCG: 500 TAB at 09:05

## 2019-07-09 RX ADMIN — GABAPENTIN SCH MG: 400 CAPSULE ORAL at 09:04

## 2019-07-09 RX ADMIN — PHENAZOPYRIDINE SCH MG: 100 TABLET ORAL at 12:00

## 2019-07-09 RX ADMIN — PHENAZOPYRIDINE SCH MG: 100 TABLET ORAL at 09:03

## 2019-07-09 RX ADMIN — CARVEDILOL SCH MG: 12.5 TABLET, FILM COATED ORAL at 09:04

## 2019-07-09 RX ADMIN — CEFTRIAXONE SODIUM SCH MLS/HR: 1 INJECTION, POWDER, FOR SOLUTION INTRAMUSCULAR; INTRAVENOUS at 09:03

## 2019-07-09 NOTE — NUR
FAMILY

WIFE AND DAUGHTERS AT BEDSIDE. UPDATED ON PATIENT STATUS. ALL QUESTIONS AND CONCERNS 
ADDRESSED AT THIS TIME

## 2019-07-09 NOTE — NUR
Pt being admitted to ICU

NATSATISH admitted to ICU via gurney on cardiac monitor, and portable 02. Patient 
transfered to bed, connected to ICU monitoring and oxygen, and weighed by bedscale. Patient 
oriented to José Luis ward RN, unit, room, bed, and unit policies regarding patient 
care and visiting hours.  All questions and concerns addressed, patient verbalized 
understanding.

## 2019-07-09 NOTE — NUR
IV insertion

IV access obtained, via clean sterile technique by inserting 20 gauge catheter at LEFT WRIST 
after 1 attempt(s). IV secured properly. No trauma to site. Patient tolerated well.

## 2019-07-09 NOTE — NUR
IV on LFA infiltrated, removed with catheter intact, patient tolerated well



IV insertion

IV access obtained, via clean sterile technique by inserting 22 gauge catheter at RFA. IV 
secured properly. No trauma to site. Patient tolerated well.

NOTE: []

## 2019-07-10 VITALS — DIASTOLIC BLOOD PRESSURE: 93 MMHG | SYSTOLIC BLOOD PRESSURE: 149 MMHG

## 2019-07-10 VITALS — DIASTOLIC BLOOD PRESSURE: 66 MMHG

## 2019-07-10 VITALS — SYSTOLIC BLOOD PRESSURE: 124 MMHG

## 2019-07-10 VITALS — SYSTOLIC BLOOD PRESSURE: 159 MMHG

## 2019-07-10 VITALS — DIASTOLIC BLOOD PRESSURE: 80 MMHG | SYSTOLIC BLOOD PRESSURE: 150 MMHG

## 2019-07-10 VITALS — SYSTOLIC BLOOD PRESSURE: 171 MMHG | DIASTOLIC BLOOD PRESSURE: 71 MMHG

## 2019-07-10 VITALS — SYSTOLIC BLOOD PRESSURE: 150 MMHG | DIASTOLIC BLOOD PRESSURE: 80 MMHG

## 2019-07-10 VITALS — DIASTOLIC BLOOD PRESSURE: 68 MMHG | SYSTOLIC BLOOD PRESSURE: 122 MMHG

## 2019-07-10 VITALS — SYSTOLIC BLOOD PRESSURE: 160 MMHG

## 2019-07-10 VITALS — SYSTOLIC BLOOD PRESSURE: 149 MMHG

## 2019-07-10 VITALS — DIASTOLIC BLOOD PRESSURE: 53 MMHG | SYSTOLIC BLOOD PRESSURE: 132 MMHG

## 2019-07-10 VITALS — SYSTOLIC BLOOD PRESSURE: 137 MMHG | DIASTOLIC BLOOD PRESSURE: 45 MMHG

## 2019-07-10 VITALS — SYSTOLIC BLOOD PRESSURE: 151 MMHG

## 2019-07-10 LAB
ALBUMIN SERPL-MCNC: 2.6 G/DL (ref 3.4–5)
ALP SERPL-CCNC: 103 U/L (ref 45–117)
ALT SERPL-CCNC: 26 U/L (ref 16–61)
ANION GAP SERPL CALCULATED.3IONS-SCNC: 12 MMOL/L (ref 5–15)
BILIRUB SERPL-MCNC: 0.4 MG/DL (ref 0.2–1)
BUN SERPL-MCNC: 21 MG/DL (ref 7–18)
BUN/CREAT SERPL: 14.5
CALCIUM SERPL-MCNC: 8 MG/DL (ref 8.5–10.1)
CHLORIDE SERPL-SCNC: 107 MMOL/L (ref 98–107)
CO2 SERPL-SCNC: 22 MMOL/L (ref 21–32)
GLUCOSE SERPL-MCNC: 147 MG/DL (ref 74–106)
HCT VFR BLD AUTO: 42.1 % (ref 41–53)
HGB BLD-MCNC: 14 G/DL (ref 13.5–17.5)
MCH RBC QN AUTO: 32 PG (ref 28–32)
MCV RBC AUTO: 96.2 FL (ref 80–100)
POTASSIUM SERPL-SCNC: 4.5 MMOL/L (ref 3.5–5.1)
PROT SERPL-MCNC: 6.4 G/DL (ref 6.4–8.2)
SODIUM SERPL-SCNC: 141 MMOL/L (ref 136–145)

## 2019-07-10 RX ADMIN — CARVEDILOL SCH MG: 12.5 TABLET, FILM COATED ORAL at 00:00

## 2019-07-10 RX ADMIN — ATROPA BELLADONNA AND OPIUM SCH SUPP: 16.2; 3 SUPPOSITORY RECTAL at 10:00

## 2019-07-10 RX ADMIN — CEFTRIAXONE SODIUM SCH MLS/HR: 1 INJECTION, POWDER, FOR SOLUTION INTRAMUSCULAR; INTRAVENOUS at 10:22

## 2019-07-10 RX ADMIN — FAMOTIDINE SCH MG: 20 TABLET, FILM COATED ORAL at 10:23

## 2019-07-10 RX ADMIN — GABAPENTIN SCH MG: 400 CAPSULE ORAL at 21:42

## 2019-07-10 RX ADMIN — HYDROMORPHONE HYDROCHLORIDE PRN MG: 2 INJECTION, SOLUTION INTRAMUSCULAR; INTRAVENOUS; SUBCUTANEOUS at 15:46

## 2019-07-10 RX ADMIN — HYDRALAZINE HYDROCHLORIDE PRN MG: 20 INJECTION INTRAMUSCULAR; INTRAVENOUS at 02:48

## 2019-07-10 RX ADMIN — CYANOCOBALAMIN TAB 500 MCG SCH MCG: 500 TAB at 10:23

## 2019-07-10 RX ADMIN — GABAPENTIN SCH MG: 400 CAPSULE ORAL at 10:00

## 2019-07-10 RX ADMIN — CARVEDILOL SCH MG: 12.5 TABLET, FILM COATED ORAL at 21:42

## 2019-07-10 RX ADMIN — GABAPENTIN SCH MG: 400 CAPSULE ORAL at 00:00

## 2019-07-10 RX ADMIN — CARVEDILOL SCH MG: 12.5 TABLET, FILM COATED ORAL at 10:22

## 2019-07-10 RX ADMIN — PHENAZOPYRIDINE SCH MG: 100 TABLET ORAL at 08:00

## 2019-07-10 NOTE — NUR
OPENING NOTES

ASSUMED CARE, AWAKE BUT NOTED TO BE CONFUSED, SITTING AT THE EDGE OF THE BED

WITH NO SIGNS OF DISTRESS RESPIRATIONS EVEN AND UNLABORED ON ROOM AIR,

SPO2 93%, IV ACCES PATENT AND INTACT, BOYLE CATHETER DRAINING TO A  CLEAR

URINE. BED IN LOWEST POSITION WITH IE RAILS UP. WILL PUT THE BED ALARM ON ONCE 

PT IS LAYING DOWN. WILL RE-ORIENT AND CONTINUE CRE.

## 2019-07-10 NOTE — NUR
Dressing reapplied

Dressings were removed by Dr. Dee, per her request dressings to be reapplied. Areas 
cleaned with wound cleanser, pat dry with sterile gauze. Incision is closed with 16 staples, 
approximated, no erythema noted. Abdpad applied to incision to right lower abdomen, covered 
with Tegaderm. Three other incisions covered with gauze and Tegaderm. Will continue to 
monitor Q1 hour and PRN.

## 2019-07-10 NOTE — NUR
Transfer from ICU

Received patient from ICU. Patient is alert and oriented x4. No signs or symptoms of 
distress noted at this time. Patient states pain 7/10, requesting pain medications. Patient 
is on room air, respirations even and unlabored. Dressings to right lower quadrant of 
abdomen is clean and intact. Telemetry number 30, sinus rhythm 74. Patient has a Ortega 
catheter in place, patent and draining orange colored urine. Reviewed plan of care with 
patient, patient verbalized understanding. Bed in low and locked position, call light within 
reach. Wife at bedside. Will continue to monitor Q1 hour and PRN.

## 2019-07-10 NOTE — NUR
SHIFT OPENING NOTE

PATIENT AOX4, MOVING ALL EXTREMITIES, REPORTS MILD PAIN ON RIGHT SIDE OF ABDOMEN, S/P 
SURGERY. BOYLE DRAINING ORANGE URINE, BAG FREE OF KINKS AND HUNG BELOW BLADDER. ABDOMEN 
ROUND SOFT AND TENDER ON RIGHT SIDE, DRESSING X 4 WITH MINIMAL OUTPUT. SCD'S BILATERALLY TO 
LOWER EXTREMITIES. PLAN OF CARE DISCUSSED WITH PATIENT IN DETAIL

## 2019-07-10 NOTE — NUR
INCENTIVE SPIROMETER

PATIENT ABLE TO ACCURATELY USE I.S. TO 2200 CC. EDUCATED PATIENT ON IMPORTANCE OF USE. 
PATIENT VERBALIZED UNDERSTANDING

## 2019-07-11 VITALS — DIASTOLIC BLOOD PRESSURE: 88 MMHG | SYSTOLIC BLOOD PRESSURE: 124 MMHG

## 2019-07-11 VITALS — DIASTOLIC BLOOD PRESSURE: 65 MMHG | SYSTOLIC BLOOD PRESSURE: 141 MMHG

## 2019-07-11 VITALS — SYSTOLIC BLOOD PRESSURE: 128 MMHG | DIASTOLIC BLOOD PRESSURE: 62 MMHG

## 2019-07-11 VITALS — DIASTOLIC BLOOD PRESSURE: 58 MMHG | SYSTOLIC BLOOD PRESSURE: 133 MMHG

## 2019-07-11 LAB
ANION GAP SERPL CALCULATED.3IONS-SCNC: 9 MMOL/L (ref 5–15)
BUN SERPL-MCNC: 27 MG/DL (ref 7–18)
BUN/CREAT SERPL: 17.6
CALCIUM SERPL-MCNC: 8 MG/DL (ref 8.5–10.1)
CHLORIDE SERPL-SCNC: 103 MMOL/L (ref 98–107)
CO2 SERPL-SCNC: 26 MMOL/L (ref 21–32)
GLUCOSE SERPL-MCNC: 96 MG/DL (ref 74–106)
HCT VFR BLD AUTO: 39.1 % (ref 41–53)
HGB BLD-MCNC: 13.2 G/DL (ref 13.5–17.5)
MCH RBC QN AUTO: 32.4 PG (ref 28–32)
MCV RBC AUTO: 96.2 FL (ref 80–100)
POTASSIUM SERPL-SCNC: 4.7 MMOL/L (ref 3.5–5.1)
SODIUM SERPL-SCNC: 138 MMOL/L (ref 136–145)

## 2019-07-11 RX ADMIN — FAMOTIDINE SCH MG: 20 TABLET, FILM COATED ORAL at 10:02

## 2019-07-11 RX ADMIN — CEFTRIAXONE SODIUM SCH MLS/HR: 1 INJECTION, POWDER, FOR SOLUTION INTRAMUSCULAR; INTRAVENOUS at 07:55

## 2019-07-11 RX ADMIN — HYDROMORPHONE HYDROCHLORIDE PRN MG: 2 INJECTION, SOLUTION INTRAMUSCULAR; INTRAVENOUS; SUBCUTANEOUS at 08:05

## 2019-07-11 RX ADMIN — ATROPA BELLADONNA AND OPIUM SCH SUPP: 16.2; 3 SUPPOSITORY RECTAL at 10:00

## 2019-07-11 RX ADMIN — CYANOCOBALAMIN TAB 500 MCG SCH MCG: 500 TAB at 10:02

## 2019-07-11 RX ADMIN — HYDRALAZINE HYDROCHLORIDE PRN MG: 20 INJECTION INTRAMUSCULAR; INTRAVENOUS at 07:03

## 2019-07-11 RX ADMIN — GABAPENTIN SCH MG: 400 CAPSULE ORAL at 09:59

## 2019-07-11 RX ADMIN — CARVEDILOL SCH MG: 12.5 TABLET, FILM COATED ORAL at 10:04

## 2019-07-11 RX ADMIN — GABAPENTIN SCH MG: 400 CAPSULE ORAL at 23:05

## 2019-07-11 RX ADMIN — CARVEDILOL SCH MG: 12.5 TABLET, FILM COATED ORAL at 23:04

## 2019-07-11 NOTE — NUR
Opening Shift Note

Assumed care of patient, patient currently sleeping, breathing even and unlabored.  No S/S 
of distress/SOB or pain reported at this time.  Instructed on POC and to call for assist 
PRN, bed alarm activated and call light within reach, Skin assessed to abd, noted 2 small 
dressings CDI, lower abd with 16 well approximated staples, site is warm to touch and 
tender, and one small incision above with 4 staples, will continue to monitor for changes 
Q1hr and PRN.

## 2019-07-11 NOTE — NUR
UROLOGY MD PAGED

DR NOE AGUILAR, REGARDING INCREASED SWELLING, REDNESS AND WARMTH TO RIGHT LOWER QUADRANT ABD 
INCISION, 16 WELL APPROXIMATED, VS WNL, SITE TENDER TO TOUCH, CONT TO CLOSELY MONITOR

## 2019-07-11 NOTE — NUR
VOID

PT HAD INCONTINENT EPISODE. WILL CONTINUE TO MONITOR.

-------------------------------------------------------------------------------

Signed:    07/11/19 at 1257 by SN JAMI <Co-Signature Required>

Co-Signed: 07/11/19 at 1257 by Olga Cabrales RN

-------------------------------------------------------------------------------

## 2019-07-11 NOTE — NUR
PATIENT AMBULATION

PATIENT AMBULATED TO BATHROOM WITH ASSISTANCE OF RN AND WITH FWW ON ROOM AIR. PATIENT GAIT 
UNSTEADY AND WEAK. NO S/S OF DISTRESS. PATIENT AMBULATED BACK TO BED. ASSESSED ABDOMEN, 
INCISIONS & STAPLES CLEAN, DRY, AND INTACT. BED ALARM ACTIVATED, CALL LIGHT WITHIN REACH AND 
PT INSTRUCTED TO CALL FOR HELP. WILL CONTINUE TO MONITOR.

-------------------------------------------------------------------------------

Signed:    07/11/19 at 1834 by SN JAMI <Co-Signature Required>

Co-Signed: 07/11/19 at 1834 by Olga Cabrales RN

-------------------------------------------------------------------------------

## 2019-07-11 NOTE — NUR
PATIENT AMBULATION

PATIENT AMBULATED TO BATHROOM WITH ASSISTANCE OF RN AND WITH FWW ON ROOM AIR. PATIENT GAIT 
UNSTEADY AND WEAK. NO S/S OF DISTRESS. PATIENT AMBULATED BACK TO BED. ASSESSED ABDOMEN, 
INCISIONS & STAPLES CLEAN, DRY, AND INTACT. BED ALARM ACTIVATED, CALL LIGHT WITHIN REACH AND 
PT INSTRUCTED TO CALL FOR HELP. WILL CONTINUE TO MONITOR.

-------------------------------------------------------------------------------

Signed:    07/11/19 at 1835 by SN JAMI <Co-Signature Required>

Co-Signed: 07/11/19 at 1835 by Olga Cabrales RN

-------------------------------------------------------------------------------

## 2019-07-11 NOTE — NUR
MD CALLED BACK

SPOKE WITH DR NOE MD INFORMED OF WOUND APPEARANCE, NO NEW ORDERS RECEIVED, CONT TO MONITOR

## 2019-07-11 NOTE — NUR
UROLOGY MD AT BEDSIDE

DR LIU IN TO SEE PT, PT CURRENTLY SLEEPING, MD AWARE BOYLE WAS DISCONTINUES THIS MORNING, MD 
STATES HE SPOKE WITH DR ELIZONDO AND ITS OKAY TO LEAVE BOYLE OUT, WILL MONITOR FOR VOIDS, 
CONT CARE

## 2019-07-11 NOTE — NUR
SCD'S APPLIED

APPLIED SCD'S TO PATIENT TO BLE. EDUCATED PATIENT AND FAMILY ON IMPORTANCE OF SCD'S. PATIENT 
AND FAMILY VERBALIZED UNDERSTANDING. WILL CONTINUE TO MONITOR.

-------------------------------------------------------------------------------

Signed:    07/11/19 at 1837 by SN JAMI <Co-Signature Required>

Co-Signed: 07/11/19 at 1837 by Olga Cabrales RN

-------------------------------------------------------------------------------

## 2019-07-11 NOTE — NUR
Opening shift note



Pt is resting in bed with eyes closed and resp rate is even and unlabored. Pt found with 
feet over edge of the bottom of the bed and repositioned at this time. Pt is incont of urine 
and all linens and gown changed at this time. Bed is low, wheels are locked, and call light 
is with in reach. Bed alarm is set for pt safety.

## 2019-07-12 VITALS — SYSTOLIC BLOOD PRESSURE: 125 MMHG | DIASTOLIC BLOOD PRESSURE: 69 MMHG

## 2019-07-12 VITALS — DIASTOLIC BLOOD PRESSURE: 65 MMHG | SYSTOLIC BLOOD PRESSURE: 137 MMHG

## 2019-07-12 VITALS — SYSTOLIC BLOOD PRESSURE: 128 MMHG | DIASTOLIC BLOOD PRESSURE: 69 MMHG

## 2019-07-12 VITALS — DIASTOLIC BLOOD PRESSURE: 62 MMHG | SYSTOLIC BLOOD PRESSURE: 118 MMHG

## 2019-07-12 VITALS — DIASTOLIC BLOOD PRESSURE: 47 MMHG | SYSTOLIC BLOOD PRESSURE: 141 MMHG

## 2019-07-12 LAB
HCT VFR BLD AUTO: 38.7 % (ref 41–53)
HGB BLD-MCNC: 13.2 G/DL (ref 13.5–17.5)
MCH RBC QN AUTO: 32.4 PG (ref 28–32)
MCV RBC AUTO: 95.4 FL (ref 80–100)
NRBC BLD QL AUTO: 0 %

## 2019-07-12 RX ADMIN — CARVEDILOL SCH MG: 12.5 TABLET, FILM COATED ORAL at 21:53

## 2019-07-12 RX ADMIN — Medication SCH ML: at 21:53

## 2019-07-12 RX ADMIN — GABAPENTIN SCH MG: 400 CAPSULE ORAL at 09:53

## 2019-07-12 RX ADMIN — GABAPENTIN SCH MG: 400 CAPSULE ORAL at 21:50

## 2019-07-12 RX ADMIN — HYDROMORPHONE HYDROCHLORIDE PRN MG: 2 INJECTION, SOLUTION INTRAMUSCULAR; INTRAVENOUS; SUBCUTANEOUS at 09:54

## 2019-07-12 RX ADMIN — CEFTRIAXONE SODIUM SCH MLS/HR: 1 INJECTION, POWDER, FOR SOLUTION INTRAMUSCULAR; INTRAVENOUS at 09:52

## 2019-07-12 RX ADMIN — CYANOCOBALAMIN TAB 500 MCG SCH MCG: 500 TAB at 09:52

## 2019-07-12 RX ADMIN — ATROPA BELLADONNA AND OPIUM SCH SUPP: 16.2; 3 SUPPOSITORY RECTAL at 10:00

## 2019-07-12 RX ADMIN — CARVEDILOL SCH MG: 12.5 TABLET, FILM COATED ORAL at 09:53

## 2019-07-12 RX ADMIN — FAMOTIDINE SCH MG: 20 TABLET, FILM COATED ORAL at 09:52

## 2019-07-12 RX ADMIN — Medication SCH ML: at 18:31

## 2019-07-12 NOTE — NUR
Opening Shift Note

Assumed care of patient, patient currently sleeping, breathing even and unlabored, HOB>30.  
No S/S of distress/SOB or pain reported at this time.  Instructed on POC and to call for 
assist PRN, bed alarm activated and call light within reach, Skin assessed to abd, noted 3 
small dressings CDI, lower abd with 16 well approximated staples, site is warm to touch and 
tender, will continue to monitor for changes Q1hr and PRN.

## 2019-07-12 NOTE — NUR
UROLOGY AT BEDSIDE

DR LIU AT BEDSIDE, ASSESSED INCISIONAL SITES, STATES " ITS EXPECTED TO BRUISE AND LOOKS 
GOOD", MD STATES TO CONT WIH ABX, CONT CARE

## 2019-07-12 NOTE — NUR
Nutrition Follow-up Notes 



Wt.: 98.6 kg based on bed scale as of yesterday

Pt 's s/p Robotic assisted Lap Nephroureterectomy (19), asleep, no signs of distress 
noted earlier, currently on Soft diet with Ensure Enlive 1 carton QID, has adequate PO 
intake aeb 80% ave. consumed meals (x6) in last 2.5 days.



Est. Needs BW [id number] kcal (25-30 kcal/kgBW), 75-94 gms pro (0.8-1.0 gms/kgBW r/t 
elev RFT). Will continue to monitor pertinent labs and reassess nutrient need prn



Labs: BUN 27 H, Cr 1.53 H, Ca 8.0 L, Alb 2.6 L, Trig 197 H 



Skin: Charbel scale 17, mod risk, skin intact per RN doc 



GI: Pt had 2x BM  per RN documentation. 



PES:  

Altered nutrition related lab values r/t current/chronic medical condition aeb elev BUN, 
mild hypoalb, elev TG



Will continue to monitor PO intake, skin status, pertinent labs and weight trend. F/u in 3 
to 5 days.

Rec.: 1.)  Consider Soft Cardiac: 2 gms Na, Low Chol, Low Fat diet  2.) If Albumin level 
continues trending down with improved renal labs, consider Prostat 1 pkt BID. 3.) Continue 
close supervision and feeding assistance prn during meals 4.) Refer to RD for further 
nutrition education and weight monitoring upon discharged. 5.) Continue current plan of 
care.

## 2019-07-12 NOTE — NUR
MD WAS INFORMED OF ABD CT RESULTS

SPOKE WITH GORDON BROWN (NP), ORDERS RECEIVED FOR SMALL BOWEL FOLLOW THROUGH, AND SURGICAL 
CONSULT FOR TOMORROW, RBO, CONT CARE

## 2019-07-12 NOTE — NUR
VOID

PT ABLE TO USE URINAL, AXOX3, PT USED CALL LIGHT TO CALL FOR HELP TO DISCARD 400 CC OF 
URINE, URINE LIGHT SIVAKUMAR AND CLEAR, CONT CARE

## 2019-07-12 NOTE — NUR
Hospitalist Tatyana LAWSON called RN and states he cancelled the small bowel series and changed 
it to CT of the Abdomen with oral contrast instead. He said to inform Dr. Zack Goel. RN 
called the exchange but it is only for medically urgent matter or else call back during 
office hours.

## 2019-07-12 NOTE — NUR
re-assessment

Per  consult dc planning to home in AM. Patient will discharge home with family. Patient 
may benefit from a fww on discharge. 

-------------------------------------------------------------------------------

Addendum: 07/17/19 at 1512 by Chaya Jiménez 

-------------------------------------------------------------------------------

Amended: Links added.

## 2019-07-12 NOTE — NUR
PHYSICAL THERAPY

PT ASSISTED WITH AMBULATION WITH ASSISTANCE OF FWW, PT TOLERATED AMBULATION AND REQUESTED TO 
STOP R/T PAIN, PT MEDICATED AS ORDERED, ASSESSED INCISIONS, STAPLES CONT TO WELL 
APPROXIMATED,   CONT CARE

## 2019-07-12 NOTE — NUR
PT OFF UNIT/CT

PT TKEN VIA BED FOR CT SCAN, FAMILY AT BEDSIDE AND UPDATED, NO DISTRESS NOTED AT THIS TIME, 
CONT CARE

## 2019-07-13 VITALS — DIASTOLIC BLOOD PRESSURE: 73 MMHG | SYSTOLIC BLOOD PRESSURE: 131 MMHG

## 2019-07-13 VITALS — DIASTOLIC BLOOD PRESSURE: 63 MMHG | SYSTOLIC BLOOD PRESSURE: 121 MMHG

## 2019-07-13 VITALS — DIASTOLIC BLOOD PRESSURE: 62 MMHG | SYSTOLIC BLOOD PRESSURE: 113 MMHG

## 2019-07-13 VITALS — SYSTOLIC BLOOD PRESSURE: 130 MMHG | DIASTOLIC BLOOD PRESSURE: 73 MMHG

## 2019-07-13 VITALS — SYSTOLIC BLOOD PRESSURE: 139 MMHG | DIASTOLIC BLOOD PRESSURE: 78 MMHG

## 2019-07-13 LAB
ANION GAP SERPL CALCULATED.3IONS-SCNC: 6 MMOL/L (ref 5–15)
BUN SERPL-MCNC: 28 MG/DL (ref 7–18)
BUN/CREAT SERPL: 19
CALCIUM SERPL-MCNC: 8.7 MG/DL (ref 8.5–10.1)
CHLORIDE SERPL-SCNC: 100 MMOL/L (ref 98–107)
CO2 SERPL-SCNC: 28 MMOL/L (ref 21–32)
GLUCOSE SERPL-MCNC: 118 MG/DL (ref 74–106)
HCT VFR BLD AUTO: 41.4 % (ref 41–53)
HGB BLD-MCNC: 14.1 G/DL (ref 13.5–17.5)
MCH RBC QN AUTO: 32.2 PG (ref 28–32)
MCV RBC AUTO: 94.7 FL (ref 80–100)
NRBC BLD QL AUTO: 0 %
POTASSIUM SERPL-SCNC: 4.5 MMOL/L (ref 3.5–5.1)
SODIUM SERPL-SCNC: 134 MMOL/L (ref 136–145)

## 2019-07-13 RX ADMIN — FAMOTIDINE SCH MG: 20 TABLET, FILM COATED ORAL at 09:03

## 2019-07-13 RX ADMIN — Medication SCH ML: at 12:30

## 2019-07-13 RX ADMIN — HYDROMORPHONE HYDROCHLORIDE PRN MG: 2 INJECTION, SOLUTION INTRAMUSCULAR; INTRAVENOUS; SUBCUTANEOUS at 10:28

## 2019-07-13 RX ADMIN — CARVEDILOL SCH MG: 12.5 TABLET, FILM COATED ORAL at 09:08

## 2019-07-13 RX ADMIN — CEFTRIAXONE SODIUM SCH MLS/HR: 1 INJECTION, POWDER, FOR SOLUTION INTRAMUSCULAR; INTRAVENOUS at 09:03

## 2019-07-13 RX ADMIN — Medication SCH ML: at 12:00

## 2019-07-13 RX ADMIN — Medication SCH ML: at 06:00

## 2019-07-13 RX ADMIN — GABAPENTIN SCH MG: 400 CAPSULE ORAL at 09:03

## 2019-07-13 RX ADMIN — SODIUM CHLORIDE SCH MLS/HR: 0.9 INJECTION, SOLUTION INTRAVENOUS at 15:38

## 2019-07-13 RX ADMIN — ATROPA BELLADONNA AND OPIUM SCH SUPP: 16.2; 3 SUPPOSITORY RECTAL at 10:00

## 2019-07-13 RX ADMIN — SODIUM CHLORIDE SCH MG: 9 INJECTION, SOLUTION INTRAVENOUS at 22:08

## 2019-07-13 RX ADMIN — CYANOCOBALAMIN TAB 500 MCG SCH MCG: 500 TAB at 09:03

## 2019-07-13 NOTE — NUR
Spoke to LULU Joe regarding Po meds ordered fro patient and he ordered the followin) 
Discontinue Coreg and Azathiopine, 2) Add Metoprolol 12.5 mg IV push q 4 hrs PRN for SBP> 
140 and heat rate > 110. Orders repeated, verified, and placed.

## 2019-07-13 NOTE — NUR
Opening Shift Note

Assumed care of patient, awake and alert x 4 but very hard of hearing. No S/S of 
distress/SOB. Bed is in lowest position and locked. Call light within reach. Board updated. 
NG tube positive on auscultation for correct placement. NG tube reconnected to low 
intermittent suction per MD order. NS infusing at 100 ml/hr per MD order. Ortega catheter 
secured to thigh and collection bag hangs below bladder attached to non-moveable part of 
bedframe. Instructed on POC and to call for assist PRN, will continue to monitor for changes 
Q1hr and PRN.

## 2019-07-13 NOTE — NUR
UROLOGY



SPOKE TO DR. LIU, READ CT ABD PELVIS WITH ORAL CONTRAST RESULTS. UPDATED HIM ON PT STATUS, 
PT HAVING N/V AND ABD PAIN. NEW ORDERS GIVEN AND ORDERS PLACED.

## 2019-07-14 VITALS — DIASTOLIC BLOOD PRESSURE: 55 MMHG | SYSTOLIC BLOOD PRESSURE: 132 MMHG

## 2019-07-14 VITALS — SYSTOLIC BLOOD PRESSURE: 129 MMHG | DIASTOLIC BLOOD PRESSURE: 74 MMHG

## 2019-07-14 VITALS — SYSTOLIC BLOOD PRESSURE: 137 MMHG | DIASTOLIC BLOOD PRESSURE: 67 MMHG

## 2019-07-14 VITALS — SYSTOLIC BLOOD PRESSURE: 155 MMHG | DIASTOLIC BLOOD PRESSURE: 51 MMHG

## 2019-07-14 VITALS — SYSTOLIC BLOOD PRESSURE: 123 MMHG | DIASTOLIC BLOOD PRESSURE: 67 MMHG

## 2019-07-14 LAB
ANION GAP SERPL CALCULATED.3IONS-SCNC: 5 MMOL/L (ref 5–15)
BUN SERPL-MCNC: 28 MG/DL (ref 7–18)
BUN/CREAT SERPL: 19.3
CALCIUM SERPL-MCNC: 8.4 MG/DL (ref 8.5–10.1)
CHLORIDE SERPL-SCNC: 105 MMOL/L (ref 98–107)
CO2 SERPL-SCNC: 28 MMOL/L (ref 21–32)
GLUCOSE SERPL-MCNC: 85 MG/DL (ref 74–106)
HCT VFR BLD AUTO: 38.2 % (ref 41–53)
HGB BLD-MCNC: 13 G/DL (ref 13.5–17.5)
MCH RBC QN AUTO: 32.4 PG (ref 28–32)
MCV RBC AUTO: 95 FL (ref 80–100)
NRBC BLD QL AUTO: 0.2 %
POTASSIUM SERPL-SCNC: 4.6 MMOL/L (ref 3.5–5.1)
SODIUM SERPL-SCNC: 138 MMOL/L (ref 136–145)

## 2019-07-14 RX ADMIN — ATROPA BELLADONNA AND OPIUM SCH SUPP: 16.2; 3 SUPPOSITORY RECTAL at 10:00

## 2019-07-14 RX ADMIN — SODIUM CHLORIDE SCH MLS/HR: 0.9 INJECTION, SOLUTION INTRAVENOUS at 00:09

## 2019-07-14 RX ADMIN — SODIUM CHLORIDE SCH MLS/HR: 0.9 INJECTION, SOLUTION INTRAVENOUS at 20:49

## 2019-07-14 RX ADMIN — SODIUM CHLORIDE SCH MG: 9 INJECTION, SOLUTION INTRAVENOUS at 08:57

## 2019-07-14 RX ADMIN — SODIUM CHLORIDE SCH MG: 9 INJECTION, SOLUTION INTRAVENOUS at 21:16

## 2019-07-14 RX ADMIN — SODIUM CHLORIDE SCH MLS/HR: 0.9 INJECTION, SOLUTION INTRAVENOUS at 14:58

## 2019-07-14 RX ADMIN — CEFTRIAXONE SODIUM SCH MLS/HR: 1 INJECTION, POWDER, FOR SOLUTION INTRAMUSCULAR; INTRAVENOUS at 08:57

## 2019-07-14 NOTE — NUR
IV insertion

IV access obtained, via clean sterile technique by inserting 22 gauge catheter at LEFT HAND] 
after  attempt(s). IV secured properly. No trauma to site. Patient tolerated well.

NOTE:

## 2019-07-14 NOTE — NUR
SPOKE TO GORDON BROWN



STATED IT WAS OKAY TO LEAVE OUT NG TUBE, BUT IF THE PATIENT STARTS HAVING NAUSEA OR VOMITING 
AGAIN TO PLACE A NEW NG TUBE.

## 2019-07-14 NOTE — NUR
Patient accidentally pulled out his NG tube. Replaced NG tub with a 12 Macedonian tube. 
Auscultated placement confirmed by two RNs. Chest X-ray ordered per protocol. Patient 
tolerated.

## 2019-07-14 NOTE — NUR
Opening Shift Note

Assumed care of patient, awake and alert x 3.  No S/S of distress/SOB. Bed is in lowest 
position and locked. Call light within reach. Board updated. Ortega catheter secured to thigh 
and collection bag is hung below bladder to non-moveable part of bedframe.tele box number 
matches monitor and leads are in correct placement. Instructed on POC and to call for assist 
PRN, will continue to monitor for changes Q1hr and PRN.

## 2019-07-15 VITALS — DIASTOLIC BLOOD PRESSURE: 68 MMHG | SYSTOLIC BLOOD PRESSURE: 142 MMHG

## 2019-07-15 VITALS — SYSTOLIC BLOOD PRESSURE: 166 MMHG | DIASTOLIC BLOOD PRESSURE: 78 MMHG

## 2019-07-15 VITALS — DIASTOLIC BLOOD PRESSURE: 71 MMHG | SYSTOLIC BLOOD PRESSURE: 141 MMHG

## 2019-07-15 VITALS — DIASTOLIC BLOOD PRESSURE: 79 MMHG | SYSTOLIC BLOOD PRESSURE: 157 MMHG

## 2019-07-15 VITALS — SYSTOLIC BLOOD PRESSURE: 137 MMHG | DIASTOLIC BLOOD PRESSURE: 59 MMHG

## 2019-07-15 VITALS — DIASTOLIC BLOOD PRESSURE: 78 MMHG | SYSTOLIC BLOOD PRESSURE: 168 MMHG

## 2019-07-15 LAB
ANION GAP SERPL CALCULATED.3IONS-SCNC: 6 MMOL/L (ref 5–15)
BUN SERPL-MCNC: 27 MG/DL (ref 7–18)
BUN/CREAT SERPL: 17.1
CALCIUM SERPL-MCNC: 8.1 MG/DL (ref 8.5–10.1)
CHLORIDE SERPL-SCNC: 107 MMOL/L (ref 98–107)
CO2 SERPL-SCNC: 26 MMOL/L (ref 21–32)
GLUCOSE SERPL-MCNC: 87 MG/DL (ref 74–106)
HCT VFR BLD AUTO: 38.6 % (ref 41–53)
HGB BLD-MCNC: 13.1 G/DL (ref 13.5–17.5)
MCH RBC QN AUTO: 32.3 PG (ref 28–32)
MCV RBC AUTO: 95.1 FL (ref 80–100)
POTASSIUM SERPL-SCNC: 4.2 MMOL/L (ref 3.5–5.1)
SODIUM SERPL-SCNC: 139 MMOL/L (ref 136–145)

## 2019-07-15 RX ADMIN — SODIUM CHLORIDE SCH MLS/HR: 0.9 INJECTION, SOLUTION INTRAVENOUS at 18:04

## 2019-07-15 RX ADMIN — SODIUM CHLORIDE SCH MG: 9 INJECTION, SOLUTION INTRAVENOUS at 09:25

## 2019-07-15 RX ADMIN — SODIUM CHLORIDE SCH MLS/HR: 0.9 INJECTION, SOLUTION INTRAVENOUS at 05:28

## 2019-07-15 RX ADMIN — ATROPA BELLADONNA AND OPIUM SCH SUPP: 16.2; 3 SUPPOSITORY RECTAL at 10:00

## 2019-07-15 RX ADMIN — CARVEDILOL SCH MG: 3.12 TABLET, FILM COATED ORAL at 22:35

## 2019-07-15 RX ADMIN — CEFTRIAXONE SODIUM SCH MLS/HR: 1 INJECTION, POWDER, FOR SOLUTION INTRAMUSCULAR; INTRAVENOUS at 09:25

## 2019-07-15 RX ADMIN — SODIUM CHLORIDE SCH MG: 9 INJECTION, SOLUTION INTRAVENOUS at 22:35

## 2019-07-15 NOTE — NUR
Pt seen by Dr. Vizcarra, he said no need for surgery at this time and pt can have full liquid 
diet.

## 2019-07-15 NOTE — NUR
Dr. Ascencio at bedside

Per Dr. Ascencio if pt tolerates his diet he will possibly be going home tomorrow.

## 2019-07-15 NOTE — NUR
SURGICAL CONSULT

PT SEEN BY BRANDON GLYNN, MADE AWARE PT HAD A TOTAL OF 4 BM , GREEN, WATERY IN MODERATE AMOUNT.

## 2019-07-15 NOTE — NUR
Nutrition Follow-up Notes 



Wt.: 99.1 kg



Pt 's s/p Robotic assisted Lap Nephroureterectomy (19), asleep, no signs of distress 
noted earlier, currently on full liq diet with Ensure Enlive 1 carton QID, has inadequate PO 
of < 50% x 5 per RN doc as pt was NPO



Est. Needs BW [id number] kcal (25-30 kcal/kgBW), 75-94 gms pro (0.8-1.0 gms/kgBW r/t 
elev RFT). Will continue to monitor pertinent labs and reassess nutrient need prn



Labs: BUN 27 H, CREAT 1.58 H, CA 8.1 L.



Skin: Charbel scale 17, mod risk, skin intact per RN doc 



GI: Pt had 3 BM today per RN documentation. 



PES:  

Altered nutrition related lab values r/t current/chronic medical condition aeb elev BUN, 
mild hypoalb, elev TG



Will continue to monitor PO intake, skin status, pertinent labs and weight trend. F/u in 3 
to 5 days.

Rec.: 1.)  Advance to Soft Cardiac: 2 gms Na, Low Chol, Low Fat diet  2.) If Albumin level 
continues trending down with improved renal labs, consider Prostat 1 pkt BID. 3.) Continue 
close supervision and feeding assistance prn during meals 4.) Refer to RD for further 
nutrition education and weight monitoring upon discharged. 5.) Continue current plan of 
care.

## 2019-07-15 NOTE — NUR
called radiology to follow up small bowel series report, spoke to Matty. Per Matty he 
will find out.

## 2019-07-15 NOTE — NUR
Opening Shift Note

Received report from vale Lara RN.  Assumed care of patient, awake and alert.  No S/S of 
distress/SOB or pain.  Instructed on POC and to call for assist PRN, will continue to 
monitor for changes Q1hr and PRN.  Bed placed in lowest position, bed alarm turned on and 
call light within reach.

## 2019-07-16 VITALS — DIASTOLIC BLOOD PRESSURE: 72 MMHG | SYSTOLIC BLOOD PRESSURE: 155 MMHG

## 2019-07-16 VITALS — DIASTOLIC BLOOD PRESSURE: 78 MMHG | SYSTOLIC BLOOD PRESSURE: 143 MMHG

## 2019-07-16 VITALS — SYSTOLIC BLOOD PRESSURE: 136 MMHG | DIASTOLIC BLOOD PRESSURE: 67 MMHG

## 2019-07-16 VITALS — DIASTOLIC BLOOD PRESSURE: 65 MMHG | SYSTOLIC BLOOD PRESSURE: 127 MMHG

## 2019-07-16 VITALS — DIASTOLIC BLOOD PRESSURE: 82 MMHG | SYSTOLIC BLOOD PRESSURE: 136 MMHG

## 2019-07-16 LAB
ANION GAP SERPL CALCULATED.3IONS-SCNC: 8 MMOL/L (ref 5–15)
BUN SERPL-MCNC: 19 MG/DL (ref 7–18)
BUN/CREAT SERPL: 13.8
CALCIUM SERPL-MCNC: 7.7 MG/DL (ref 8.5–10.1)
CHLORIDE SERPL-SCNC: 110 MMOL/L (ref 98–107)
CO2 SERPL-SCNC: 25 MMOL/L (ref 21–32)
GLUCOSE SERPL-MCNC: 108 MG/DL (ref 74–106)
HCT VFR BLD AUTO: 38.1 % (ref 41–53)
HGB BLD-MCNC: 12.9 G/DL (ref 13.5–17.5)
MCH RBC QN AUTO: 31.9 PG (ref 28–32)
MCV RBC AUTO: 94 FL (ref 80–100)
NRBC BLD QL AUTO: 0 %
POTASSIUM SERPL-SCNC: 4 MMOL/L (ref 3.5–5.1)
SODIUM SERPL-SCNC: 143 MMOL/L (ref 136–145)

## 2019-07-16 RX ADMIN — SODIUM CHLORIDE SCH MG: 9 INJECTION, SOLUTION INTRAVENOUS at 09:49

## 2019-07-16 RX ADMIN — ATROPA BELLADONNA AND OPIUM SCH SUPP: 16.2; 3 SUPPOSITORY RECTAL at 09:51

## 2019-07-16 RX ADMIN — CARVEDILOL SCH MG: 3.12 TABLET, FILM COATED ORAL at 21:37

## 2019-07-16 RX ADMIN — SODIUM CHLORIDE SCH MG: 9 INJECTION, SOLUTION INTRAVENOUS at 21:36

## 2019-07-16 RX ADMIN — SODIUM CHLORIDE SCH MLS/HR: 0.9 INJECTION, SOLUTION INTRAVENOUS at 05:49

## 2019-07-16 RX ADMIN — CARVEDILOL SCH MG: 3.12 TABLET, FILM COATED ORAL at 09:50

## 2019-07-16 NOTE — NUR
PATIENT HAD A BOWEL MOVEMENT, MODERATE AMOUNT OF GREENISH BROWN WATERY STOOL.  COMPLETE BED 
CHANGED DONE.  PATIENT IS NOT ABLE TO MAKE IT TO THE COMMODE/TOILET.

## 2019-07-16 NOTE — NUR
PT SEEN BY DR. DAIJA CHOE ADVANCE THE PT'S DIET TO MECHANICAL SOFT DIET, WILL KEEP PT FOR  TODAY AND 
MONITOR FOR DIARRHEA.

## 2019-07-16 NOTE — NUR
IV removal

IV to left hand infiltrated. IV DC'd with sterile technique, catheter fully intact. Pressure 
dressing applied to site. Patient tolerated procedure well.

## 2019-07-16 NOTE — NUR
OPENING NOTE

REPORT RECEIVED FROM DAY SHIFT RN

PATIENT IS SLEEPING. VISIBLE RISE AND FALL OF CHEST NOTED. BOYLE DRAINING YELLOW URINE TO 
GRAVITY. NO S/S OF DISTRESS. FALL PRECAUTIONS IN PLACE, CALL LIGHT WITHIN REACH. WILL 
MONITOR Q1H PRN THROUGHOUT SHIFT.

## 2019-07-16 NOTE — NUR
IV insertion

IV access obtained, via clean sterile technique by inserting 22 gauge catheter at right hand 
after first attempt. IV secured properly. No trauma to site. Patient tolerated well.

## 2019-07-17 VITALS — DIASTOLIC BLOOD PRESSURE: 75 MMHG | SYSTOLIC BLOOD PRESSURE: 129 MMHG

## 2019-07-17 VITALS — DIASTOLIC BLOOD PRESSURE: 88 MMHG | SYSTOLIC BLOOD PRESSURE: 159 MMHG

## 2019-07-17 VITALS — SYSTOLIC BLOOD PRESSURE: 154 MMHG | DIASTOLIC BLOOD PRESSURE: 93 MMHG

## 2019-07-17 VITALS — SYSTOLIC BLOOD PRESSURE: 147 MMHG | DIASTOLIC BLOOD PRESSURE: 79 MMHG

## 2019-07-17 RX ADMIN — SODIUM CHLORIDE SCH MG: 9 INJECTION, SOLUTION INTRAVENOUS at 09:35

## 2019-07-17 RX ADMIN — ATROPA BELLADONNA AND OPIUM SCH SUPP: 16.2; 3 SUPPOSITORY RECTAL at 09:36

## 2019-07-17 RX ADMIN — CARVEDILOL SCH MG: 3.12 TABLET, FILM COATED ORAL at 09:36

## 2019-07-17 NOTE — NUR
DR. CHOE AT BEDSIDE

PER DR. CHOE PT CAN GO HOME WITH BOYLE CATHETER TO LEG BAG AND WILL FOLLOW UP WITH DR. LIU UROLOGY IN 1 WEEK. DR. CHOE MADE AWARE BOYLE WAS LEAKING, HE ORDERED TO INSERT BIGGER 
BOYLE AND PUT LIDOCAINE GEL TO URETHRA BEFORE INSERTION.

## 2019-07-17 NOTE — NUR
Discharge instructions given as ordered. Encourage to follow up with Dr. Wong Alcala on July 25 at 9:30am as instructed. All questions and concerns addressed. Patient verbalized 
understanding.  Medication reconciliation form completed and copy given to patient. IV 
removed with catheter intact, pressure dressing applied, discharged with rao catheter on 
leg bag.  Telemetry unit returned to ICU. Patient taken to vehicle via wheelchair with all 
personal belongings, accompanied by staff and family member. No distress noted at time of 
departure.

## 2019-07-17 NOTE — NUR
rao catheter changed to leg bag, pt and wife olga educated on how to care and drain the 
leg bag, pt and wife Olga verbalized understanding.

## 2019-07-17 NOTE — NUR
CLOSING NOTE

REPORT ENDORSED TO DAY SHIFT JESSE QUIROS

PATIENT IS RESTING IN BED. NO S/S OF DISTRESS NOTED

FALL PRECAUTION IN PLACE. CALL LIGHT WITHIN REACH

## 2019-07-17 NOTE — NUR
Per  consult for [facility]. Contacted [facility] ph: ( [phone]) fax: ( [phone]) faxed 
medical records. Per Hanna Rolon from [facility] has been approved by insurance and it will be 
deliver at bed side today 7/17/19. Informed RN Jane. 

-------------------------------------------------------------------------------

Addendum: 07/17/19 at 1605 by KEITH MELARA 

-------------------------------------------------------------------------------

Amended: Links added.